# Patient Record
Sex: FEMALE | Race: WHITE
[De-identification: names, ages, dates, MRNs, and addresses within clinical notes are randomized per-mention and may not be internally consistent; named-entity substitution may affect disease eponyms.]

---

## 2020-05-02 ENCOUNTER — HOSPITAL ENCOUNTER (EMERGENCY)
Dept: HOSPITAL 47 - EC | Age: 35
Discharge: HOME | End: 2020-05-02
Payer: COMMERCIAL

## 2020-05-02 VITALS — RESPIRATION RATE: 18 BRPM | HEART RATE: 74 BPM | SYSTOLIC BLOOD PRESSURE: 112 MMHG | DIASTOLIC BLOOD PRESSURE: 64 MMHG

## 2020-05-02 VITALS — TEMPERATURE: 98.1 F

## 2020-05-02 DIAGNOSIS — F17.200: ICD-10-CM

## 2020-05-02 DIAGNOSIS — Z20.828: ICD-10-CM

## 2020-05-02 DIAGNOSIS — K52.9: Primary | ICD-10-CM

## 2020-05-02 LAB
ALBUMIN SERPL-MCNC: 4.5 G/DL (ref 3.5–5)
ALP SERPL-CCNC: 60 U/L (ref 38–126)
ALT SERPL-CCNC: 20 U/L (ref 4–34)
ANION GAP SERPL CALC-SCNC: 9 MMOL/L
AST SERPL-CCNC: 25 U/L (ref 14–36)
BASOPHILS # BLD AUTO: 0.1 K/UL (ref 0–0.2)
BASOPHILS NFR BLD AUTO: 1 %
BUN SERPL-SCNC: 17 MG/DL (ref 7–17)
CALCIUM SPEC-MCNC: 9.7 MG/DL (ref 8.4–10.2)
CHLORIDE SERPL-SCNC: 101 MMOL/L (ref 98–107)
CO2 SERPL-SCNC: 28 MMOL/L (ref 22–30)
EOSINOPHIL # BLD AUTO: 0 K/UL (ref 0–0.7)
EOSINOPHIL NFR BLD AUTO: 0 %
ERYTHROCYTE [DISTWIDTH] IN BLOOD BY AUTOMATED COUNT: 5.32 M/UL (ref 3.8–5.4)
ERYTHROCYTE [DISTWIDTH] IN BLOOD: 13.3 % (ref 11.5–15.5)
GLUCOSE SERPL-MCNC: 103 MG/DL (ref 74–99)
HCT VFR BLD AUTO: 50.6 % (ref 34–46)
HGB BLD-MCNC: 16.3 GM/DL (ref 11.4–16)
KETONES UR QL STRIP.AUTO: (no result)
LYMPHOCYTES # SPEC AUTO: 1.5 K/UL (ref 1–4.8)
LYMPHOCYTES NFR SPEC AUTO: 17 %
MCH RBC QN AUTO: 30.5 PG (ref 25–35)
MCHC RBC AUTO-ENTMCNC: 32.1 G/DL (ref 31–37)
MCV RBC AUTO: 95.1 FL (ref 80–100)
MONOCYTES # BLD AUTO: 0.4 K/UL (ref 0–1)
MONOCYTES NFR BLD AUTO: 5 %
NEUTROPHILS # BLD AUTO: 6.8 K/UL (ref 1.3–7.7)
NEUTROPHILS NFR BLD AUTO: 75 %
PH UR: 7.5 [PH] (ref 5–8)
PLATELET # BLD AUTO: 249 K/UL (ref 150–450)
POTASSIUM SERPL-SCNC: 4.3 MMOL/L (ref 3.5–5.1)
PROT SERPL-MCNC: 7.5 G/DL (ref 6.3–8.2)
PROT UR QL: (no result)
RBC UR QL: 17 /HPF (ref 0–5)
SODIUM SERPL-SCNC: 138 MMOL/L (ref 137–145)
SP GR UR: 1.03 (ref 1–1.03)
SQUAMOUS UR QL AUTO: 29 /HPF (ref 0–4)
UROBILINOGEN UR QL STRIP: 4 MG/DL (ref ?–2)
WBC # BLD AUTO: 9.1 K/UL (ref 3.8–10.6)
WBC # UR AUTO: 4 /HPF (ref 0–5)

## 2020-05-02 PROCEDURE — 96361 HYDRATE IV INFUSION ADD-ON: CPT

## 2020-05-02 PROCEDURE — 96374 THER/PROPH/DIAG INJ IV PUSH: CPT

## 2020-05-02 PROCEDURE — 74022 RADEX COMPL AQT ABD SERIES: CPT

## 2020-05-02 PROCEDURE — 36415 COLL VENOUS BLD VENIPUNCTURE: CPT

## 2020-05-02 PROCEDURE — 81001 URINALYSIS AUTO W/SCOPE: CPT

## 2020-05-02 PROCEDURE — 87635 SARS-COV-2 COVID-19 AMP PRB: CPT

## 2020-05-02 PROCEDURE — 99284 EMERGENCY DEPT VISIT MOD MDM: CPT

## 2020-05-02 PROCEDURE — 81025 URINE PREGNANCY TEST: CPT

## 2020-05-02 PROCEDURE — 85025 COMPLETE CBC W/AUTO DIFF WBC: CPT

## 2020-05-02 PROCEDURE — 80053 COMPREHEN METABOLIC PANEL: CPT

## 2020-05-02 NOTE — XR
EXAMINATION TYPE: XR abdomen acute w cxr , 4 VIEWS

 

DATE OF EXAM ORDERED: 5/2/2020

 

HISTORY: nvd.

 

COMPARISON: None.

 

FINDINGS:  Lungs are clear. Pleural spaces are clear. The heart is not enlarged.

 

Within the abdomen, the abdominal gas pattern is normal. There is no evidence of obstruction or free 
air. There are phleboliths within the pelvis. There is an IUCD projecting over the pelvis.

 

IMPRESSION: 

 

NO ACUTE THORACIC OR ABDOMINAL ABNORMALITY.

## 2020-05-02 NOTE — ED
General Adult HPI





- General


Chief complaint: Nausea/Vomiting/Diarrhea


Stated complaint: NVD


Time Seen by Provider: 05/02/20 11:54


Source: patient


Mode of arrival: ambulatory


Limitations: no limitations





- History of Present Illness


Initial comments: 


Dictation was produced using dragon dictation software. please excuse any 

grammatical, word or spelling errors. 





This patient was cared for during a federal and state declared state of 

emergency secondary to Covid 19





Chief Complaint: 34-year-old female presents with nausea vomiting diarrhea





History of Present Illness: 34-year-old feel she has no significant past medical

history.  She has no surgical history.  She states today she's been having 

worsening nausea vomiting diarrhea.  Her symptoms have been going on for 3 days.

 Denies any fever, chills or night sweats.  Does complain of some mild 

epigastric tenderness.  States that symptoms are worse especially traumatic knee

solid foods.  She is able however to tolerate some liquids diet.  Denies any 

fever or chills or constitutional symptoms.  Patient does smoke marijuana 

regularly.  Patient believes she is not pregnant.  She takes oral contraceptive 

pills.








The ROS documented in this emergency department record has been reviewed and 

confirmed by me.  Those systems with pertinent positive or negative responses 

have been documented in the HPI.  All other systems are other negative and/or 

noncontributory.








PHYSICAL EXAM:


General Impression: Alert and oriented x3, not in acute distress


HEENT: Normocephalic atraumatic, extra-ocular movements intact, pupils equal and

reactive to light bilaterally, mucous membranes moist.


Cardiovascular: Heart regular rate and rhythm


Chest: Able to complete full sentences, no retractions, no tachypnea


Abdomen: abdomen soft, non-tender, non-distended, no organomegaly


Musculoskeletal: Pulses present and equal in all extremities, no peripheral 

edema


Motor:  no focal deficits noted


Neurological: CN II-XII grossly intact, no focal motor or sensory deficits noted


Skin: Intact with no visualized rashes


Psych: Normal affect and mood





ED course: 34-year-old female with nausea vomiting diarrhea.  Vital signs upon 

arrival are within acceptable limits.


Laboratory evaluation obtained.  CBC, metabolic panel is unremarkable.  

Urinalysis is positive for 2+ ketones.  There are 70 red blood cells but 29 

epithelial cells.  Coronal virus negative.  Urine pregnancy is negative.  Acute 

abdominal series with chest x-ray shows no acute abdominal or thoracic 

abnormalities.  Patient reevaluated after Zofran and intravenous fluids.  She is

in stable medical condition.  Patient and I have any episodes of emesis while in

the emergency department.  She feels well and is tolerating oral intake.  P

atient will be discharged with antinausea started back.  Patient understandable 

agreeable with plan.  Return parameters discussed.  Patient will be discharged. 

Clinical presentation likely secondary to gastroenteritis.











- Related Data


                                    Allergies











Allergy/AdvReac Type Severity Reaction Status Date / Time


 


No Known Allergies Allergy   Verified 05/02/20 11:49














Review of Systems


ROS Statement: 


Those systems with pertinent positive or pertinent negative responses have been 

documented in the HPI.





ROS Other: All systems not noted in ROS Statement are negative.





Past Medical History


Past Medical History: No Reported History


History of Any Multi-Drug Resistant Organisms: None Reported


Past Surgical History: No Surgical Hx Reported


Past Psychological History: No Psychological Hx Reported


Smoking Status: Current every day smoker


Past Alcohol Use History: Occasional


Past Drug Use History: Marijuana





General Exam


Limitations: no limitations





Course


                                   Vital Signs











  05/02/20





  11:50


 


Temperature 98.1 F


 


Pulse Rate 60


 


Respiratory 16





Rate 


 


Blood Pressure 95/64


 


O2 Sat by Pulse 100





Oximetry 














Medical Decision Making





- Lab Data


Result diagrams: 


                                 05/02/20 12:11





                                 05/02/20 12:11


                                   Lab Results











  05/02/20 05/02/20 05/02/20 Range/Units





  12:11 12:11 12:25 


 


WBC  9.1    (3.8-10.6)  k/uL


 


RBC  5.32    (3.80-5.40)  m/uL


 


Hgb  16.3 H    (11.4-16.0)  gm/dL


 


Hct  50.6 H    (34.0-46.0)  %


 


MCV  95.1    (80.0-100.0)  fL


 


MCH  30.5    (25.0-35.0)  pg


 


MCHC  32.1    (31.0-37.0)  g/dL


 


RDW  13.3    (11.5-15.5)  %


 


Plt Count  249    (150-450)  k/uL


 


Neutrophils %  75    %


 


Lymphocytes %  17    %


 


Monocytes %  5    %


 


Eosinophils %  0    %


 


Basophils %  1    %


 


Neutrophils #  6.8    (1.3-7.7)  k/uL


 


Lymphocytes #  1.5    (1.0-4.8)  k/uL


 


Monocytes #  0.4    (0-1.0)  k/uL


 


Eosinophils #  0.0    (0-0.7)  k/uL


 


Basophils #  0.1    (0-0.2)  k/uL


 


Sodium   138   (137-145)  mmol/L


 


Potassium   4.3   (3.5-5.1)  mmol/L


 


Chloride   101   ()  mmol/L


 


Carbon Dioxide   28   (22-30)  mmol/L


 


Anion Gap   9   mmol/L


 


BUN   17   (7-17)  mg/dL


 


Creatinine   0.84   (0.52-1.04)  mg/dL


 


Est GFR (CKD-EPI)AfAm   >90   (>60 ml/min/1.73 sqM)  


 


Est GFR (CKD-EPI)NonAf   >90   (>60 ml/min/1.73 sqM)  


 


Glucose   103 H   (74-99)  mg/dL


 


Calcium   9.7   (8.4-10.2)  mg/dL


 


Total Bilirubin   0.6   (0.2-1.3)  mg/dL


 


AST   25   (14-36)  U/L


 


ALT   20   (4-34)  U/L


 


Alkaline Phosphatase   60   ()  U/L


 


Total Protein   7.5   (6.3-8.2)  g/dL


 


Albumin   4.5   (3.5-5.0)  g/dL


 


Urine Color    Yellow  


 


Urine Appearance    Cloudy H  (Clear)  


 


Urine pH    7.5  (5.0-8.0)  


 


Ur Specific Gravity    1.032  (1.001-1.035)  


 


Urine Protein    1+ H  (Negative)  


 


Urine Glucose (UA)    Negative  (Negative)  


 


Urine Ketones    2+ H  (Negative)  


 


Urine Blood    Negative  (Negative)  


 


Urine Nitrite    Negative  (Negative)  


 


Urine Bilirubin    Negative  (Negative)  


 


Urine Urobilinogen    4.0  (<2.0)  mg/dL


 


Ur Leukocyte Esterase    Negative  (Negative)  


 


Urine RBC    17 H  (0-5)  /hpf


 


Urine WBC    4  (0-5)  /hpf


 


Ur Squamous Epith Cells    29 H  (0-4)  /hpf


 


Urine Mucus    Many H  (None)  /hpf


 


Urine HCG, Qual     (Not Detectd)  


 


Coronavirus (PCR)     (Not Detectd)  














  05/02/20 05/02/20 Range/Units





  12:25 12:25 


 


WBC    (3.8-10.6)  k/uL


 


RBC    (3.80-5.40)  m/uL


 


Hgb    (11.4-16.0)  gm/dL


 


Hct    (34.0-46.0)  %


 


MCV    (80.0-100.0)  fL


 


MCH    (25.0-35.0)  pg


 


MCHC    (31.0-37.0)  g/dL


 


RDW    (11.5-15.5)  %


 


Plt Count    (150-450)  k/uL


 


Neutrophils %    %


 


Lymphocytes %    %


 


Monocytes %    %


 


Eosinophils %    %


 


Basophils %    %


 


Neutrophils #    (1.3-7.7)  k/uL


 


Lymphocytes #    (1.0-4.8)  k/uL


 


Monocytes #    (0-1.0)  k/uL


 


Eosinophils #    (0-0.7)  k/uL


 


Basophils #    (0-0.2)  k/uL


 


Sodium    (137-145)  mmol/L


 


Potassium    (3.5-5.1)  mmol/L


 


Chloride    ()  mmol/L


 


Carbon Dioxide    (22-30)  mmol/L


 


Anion Gap    mmol/L


 


BUN    (7-17)  mg/dL


 


Creatinine    (0.52-1.04)  mg/dL


 


Est GFR (CKD-EPI)AfAm    (>60 ml/min/1.73 sqM)  


 


Est GFR (CKD-EPI)NonAf    (>60 ml/min/1.73 sqM)  


 


Glucose    (74-99)  mg/dL


 


Calcium    (8.4-10.2)  mg/dL


 


Total Bilirubin    (0.2-1.3)  mg/dL


 


AST    (14-36)  U/L


 


ALT    (4-34)  U/L


 


Alkaline Phosphatase    ()  U/L


 


Total Protein    (6.3-8.2)  g/dL


 


Albumin    (3.5-5.0)  g/dL


 


Urine Color    


 


Urine Appearance    (Clear)  


 


Urine pH    (5.0-8.0)  


 


Ur Specific Gravity    (1.001-1.035)  


 


Urine Protein    (Negative)  


 


Urine Glucose (UA)    (Negative)  


 


Urine Ketones    (Negative)  


 


Urine Blood    (Negative)  


 


Urine Nitrite    (Negative)  


 


Urine Bilirubin    (Negative)  


 


Urine Urobilinogen    (<2.0)  mg/dL


 


Ur Leukocyte Esterase    (Negative)  


 


Urine RBC    (0-5)  /hpf


 


Urine WBC    (0-5)  /hpf


 


Ur Squamous Epith Cells    (0-4)  /hpf


 


Urine Mucus    (None)  /hpf


 


Urine HCG, Qual  Not Detected   (Not Detectd)  


 


Coronavirus (PCR)   Not Detected  (Not Detectd)  














Disposition


Clinical Impression: 


 Gastroenteritis





Disposition: HOME SELF-CARE


Condition: Fair


Instructions (If sedation given, give patient instructions):  Acute Nausea and 

Vomiting (ED), Acute Diarrhea (ED)


Is patient prescribed a controlled substance at d/c from ED?: No


Referrals: 


Annamaria Garcia MD [Primary Care Provider] - 1-2 days


Time of Disposition: 13:36

## 2024-08-05 ENCOUNTER — HOSPITAL ENCOUNTER (OUTPATIENT)
Dept: HOSPITAL 47 - LABWHC1 | Age: 39
Discharge: HOME | End: 2024-08-05
Payer: COMMERCIAL

## 2024-08-05 DIAGNOSIS — R42: ICD-10-CM

## 2024-08-05 DIAGNOSIS — R53.83: Primary | ICD-10-CM

## 2024-08-05 PROCEDURE — 82728 ASSAY OF FERRITIN: CPT

## 2024-08-05 PROCEDURE — 80053 COMPREHEN METABOLIC PANEL: CPT

## 2024-08-05 PROCEDURE — 82607 VITAMIN B-12: CPT

## 2024-08-05 PROCEDURE — 85025 COMPLETE CBC W/AUTO DIFF WBC: CPT

## 2024-08-05 PROCEDURE — 83540 ASSAY OF IRON: CPT

## 2024-08-05 PROCEDURE — 84443 ASSAY THYROID STIM HORMONE: CPT

## 2024-08-05 PROCEDURE — 36415 COLL VENOUS BLD VENIPUNCTURE: CPT

## 2024-08-05 PROCEDURE — 83550 IRON BINDING TEST: CPT

## 2024-12-11 ENCOUNTER — HOSPITAL ENCOUNTER (OUTPATIENT)
Dept: HOSPITAL 47 - 3 N SLEEP | Age: 39
End: 2024-12-11
Payer: COMMERCIAL

## 2024-12-11 VITALS
HEART RATE: 70 BPM | DIASTOLIC BLOOD PRESSURE: 74 MMHG | SYSTOLIC BLOOD PRESSURE: 124 MMHG | TEMPERATURE: 98 F | RESPIRATION RATE: 16 BRPM

## 2024-12-11 DIAGNOSIS — F17.210: ICD-10-CM

## 2024-12-11 DIAGNOSIS — F90.9: ICD-10-CM

## 2024-12-11 DIAGNOSIS — G47.33: Primary | ICD-10-CM

## 2024-12-11 DIAGNOSIS — M79.7: ICD-10-CM

## 2024-12-11 PROCEDURE — 99211 OFF/OP EST MAY X REQ PHY/QHP: CPT

## 2024-12-11 NOTE — P.SLEEP
History of Present Illness


DATE: 12/11/2024





CONSULTATION/NEW PATIENT EVALUATION





HISTORY OF PRESENT ILLNESS/SLEEP-WAKE EVALUATION:   39-year-old lady had been 

evaluated in the sleep center for possible obstructive sleep apnea hypopnea 

syndrome.





SLEEP SCHEDULE: Usually sleep schedule from 11 PMmidnight to 6 AM8 AM 7 days a

week.





FALLING ASLEEP: No problems with falling asleep.





DURING SLEEP: Patient snores and wakes up from sleep up to 3 times with up to 2 

episodes of nocturia.  Positive history of sweating during sleep no history of 

hypnogogical hallucinations, sleep paralysis, or cataplexy.





DURING THE DAY/WAKE STATE: In the morning patient wake up tired, has 

difficulties to pay attention, has problems with memory.  Anna sleepiness 

scale is 4.  Usually patient does not take naps.





PAST MEDICAL HISTORY:   Fibromyalgia, ADHD.





PAST SURGICAL HISTORY: None.





MEDICATIONS:   Lyrica 200 mg twice a day, Adderall 30 mg twice a day.





SOCIAL HISTORY:   Please see below.





FAMILY HISTORY: Please see below.





REVIEW OF SYSTEMS: Snoring, multiple awakenings from sleep, sleepiness during 

the day. No fevers. No double vision. No recent chest pain. No shortness of 

breath. No abdominal pain. No bleeding episodes. No blood in urine. No seizure 

episodes. 





PHYSICAL EXAMINATION: 


GENERAL: A pleasant patient without any distress. 


VITAL SIGNS: Please see below, weight 132.4 pounds, BMI 23.7.


HEENT: PERRLA, EOMI. Evaluation of oropharynx showed tongue protrudes midline, 

low position of soft palate Mallampati 34.


NECK: Supple. No JVD. Thyroid is not palpable.   14 inches in circumference.


LUNGS: Clear to percussion and to auscultation. Good air exchange. No wheezing 

or rhonchi. 


HEART: S1, S2 regular. No murmurs, gallops or rubs. 


ABDOMEN: Soft and nontender. Bowel sounds are present. No organomegaly 

appreciated. 


EXTREMITIES: No clubbing or cyanosis. 


CNS: Awake, alert, and oriented x3. Cranial nerves 2 to 7 intact. There is no 

fasciculation or atrophy noted. No focal deficits observed. 





ASSESSMENT:


1.  Snoring, multiple awakenings from sleep, sleepiness during the day, Anna 

Sleepiness Scale is normal but patient is on 60 mg of Adderall during the day.  

Obstructive sleep apnea hypopnea syndrome.


2.  ADHD.


3.  Fibromyalgia.








PLAN: 


1.    Polysomnography for evaluation of patient's breathing during sleep. 


2.   Following plan after reading sleep study. 


3.   Preferable position during sleep on the side. 


4.   No driving if patient feels any sleepiness. Patient is aware of civil and 

criminal liability for unsafe driving. 


5.                Sleep hygiene with regular sleep time for at least 7.5-8 

hours.


6.   Watching weight. 





Thank you very much for referring this patient for consultation.





Sincerely,











Memo Starkey MD, PhD, FAASM.


Diplomat of American Board of Sleep Medicine,


Sleep Medicine Board by American Board of Medical Specialities


American Board of Internal Medicine


Medical Director of [facility]











cc: Whitney Reyna, MSN, [facility] 





Past Medical History


Past Medical History: No Reported History, Fibromyalgia


Additional Past Medical History / Comment(s): ADHD


History of Any Multi-Drug Resistant Organisms: None Reported


Past Surgical History: No Surgical Hx Reported


Past Psychological History: ADD/ADHD


Additional Psychological History / Comment(s): ADHD


Smoking Status: Current every day smoker


Past Alcohol Use History: Occasional


Past Drug Use History: Marijuana





- Past Family History


  ** Mother


Family Medical History: No Reported History





  ** Father


Additional Family Medical History / Comment(s): Bipolar, alcoholic, eczema





Medications and Allergies


                                Home Medications











 Medication  Instructions  Recorded  Confirmed  Type


 


Dextroamphetamine/Amphetamine 30 mg PO BID 12/11/24 12/11/24 History





[Adderall Xr 30 mg Capsule]    


 


Pregabalin [Lyrica] 200 mg PO BID 12/11/24 12/11/24 History








                                    Allergies











Allergy/AdvReac Type Severity Reaction Status Date / Time


 


No Known Allergies Allergy   Verified 05/02/20 11:49














Physical Exam


Vitals: 


                                   Vital Signs











  Temp Pulse Resp BP Pulse Ox


 


 12/11/24 16:26  98.0 F  70  16  124/74  98








                                Intake and Output











 12/11/24 12/11/24 12/11/24





 06:59 14:59 22:59


 


Other:   


 


  Weight   59.988 kg














Sleep Note





- Sleep Data


ESS Total: 4





- Sleep Note


Sleep Note: 


Temperature: 98.0 F


Pulse Rate: 70


Respiratory Rate: 16


Blood Pressure: 124/74


SpO2: 98


Height: 5 ft 2.5 in


Weight: 59.988 kg


BMI: 


Neck Circumference: 14